# Patient Record
Sex: FEMALE | Race: WHITE
[De-identification: names, ages, dates, MRNs, and addresses within clinical notes are randomized per-mention and may not be internally consistent; named-entity substitution may affect disease eponyms.]

---

## 2022-07-19 ENCOUNTER — HOSPITAL ENCOUNTER (EMERGENCY)
Dept: HOSPITAL 26 - MED | Age: 37
Discharge: HOME | End: 2022-07-19
Payer: MEDICAID

## 2022-07-19 VITALS — HEIGHT: 63 IN | WEIGHT: 199.38 LBS | BODY MASS INDEX: 35.33 KG/M2

## 2022-07-19 VITALS — DIASTOLIC BLOOD PRESSURE: 93 MMHG | SYSTOLIC BLOOD PRESSURE: 139 MMHG

## 2022-07-19 DIAGNOSIS — Z3A.01: ICD-10-CM

## 2022-07-19 DIAGNOSIS — R10.32: ICD-10-CM

## 2022-07-19 DIAGNOSIS — O26.891: Primary | ICD-10-CM

## 2022-07-19 LAB
APPEARANCE UR: CLEAR
BILIRUB UR QL STRIP: NEGATIVE
COLOR UR: YELLOW
GLUCOSE UR STRIP-MCNC: NEGATIVE MG/DL
HGB UR QL STRIP: NEGATIVE
LEUKOCYTE ESTERASE UR QL STRIP: NEGATIVE
NITRITE UR QL STRIP: NEGATIVE
PH UR STRIP: 5.5 [PH] (ref 5–9)

## 2022-07-19 PROCEDURE — 76801 OB US < 14 WKS SINGLE FETUS: CPT

## 2022-07-19 PROCEDURE — 81025 URINE PREGNANCY TEST: CPT

## 2022-07-19 PROCEDURE — 81003 URINALYSIS AUTO W/O SCOPE: CPT

## 2022-07-19 PROCEDURE — 99284 EMERGENCY DEPT VISIT MOD MDM: CPT

## 2022-07-19 NOTE — NUR
PER SNEHAL, PT LEFT WITHOUT BEING SEEN

-------------------------------------------------------------------------------

Addendum: 07/19/22 at 2148 by ESTEVAN

-------------------------------------------------------------------------------

PT LEFT OSCARBY, LISA BRIAN